# Patient Record
Sex: MALE | Race: WHITE | NOT HISPANIC OR LATINO | Employment: FULL TIME | URBAN - METROPOLITAN AREA
[De-identification: names, ages, dates, MRNs, and addresses within clinical notes are randomized per-mention and may not be internally consistent; named-entity substitution may affect disease eponyms.]

---

## 2023-03-25 ENCOUNTER — OFFICE VISIT (OUTPATIENT)
Dept: URGENT CARE | Facility: CLINIC | Age: 43
End: 2023-03-25
Payer: COMMERCIAL

## 2023-03-25 VITALS
SYSTOLIC BLOOD PRESSURE: 151 MMHG | WEIGHT: 248 LBS | HEIGHT: 66 IN | TEMPERATURE: 98 F | BODY MASS INDEX: 39.86 KG/M2 | RESPIRATION RATE: 18 BRPM | OXYGEN SATURATION: 97 % | DIASTOLIC BLOOD PRESSURE: 82 MMHG | HEART RATE: 67 BPM

## 2023-03-25 DIAGNOSIS — L25.5 PLANT DERMATITIS: Primary | ICD-10-CM

## 2023-03-25 PROCEDURE — 96372 PR INJECTION,THERAP/PROPH/DIAG2ST, IM OR SUBCUT: ICD-10-PCS | Mod: ,,,

## 2023-03-25 PROCEDURE — 99203 PR OFFICE/OUTPT VISIT, NEW, LEVL III, 30-44 MIN: ICD-10-PCS | Mod: 25,,,

## 2023-03-25 PROCEDURE — 96372 THER/PROPH/DIAG INJ SC/IM: CPT | Mod: ,,,

## 2023-03-25 PROCEDURE — 99203 OFFICE O/P NEW LOW 30 MIN: CPT | Mod: 25,,,

## 2023-03-25 RX ORDER — ZOLPIDEM TARTRATE 10 MG/1
10 TABLET ORAL NIGHTLY PRN
COMMUNITY
Start: 2023-02-08 | End: 2024-01-11

## 2023-03-25 RX ORDER — TESTOSTERONE CYPIONATE 1000 MG/10ML
100 INJECTION, SOLUTION INTRAMUSCULAR
COMMUNITY
Start: 2022-11-30 | End: 2024-01-11

## 2023-03-25 RX ORDER — SERTRALINE HYDROCHLORIDE 100 MG/1
200 TABLET, FILM COATED ORAL
COMMUNITY
Start: 2023-01-23

## 2023-03-25 RX ORDER — CLOBETASOL PROPIONATE 0.5 MG/G
CREAM TOPICAL 2 TIMES DAILY
Qty: 45 G | Refills: 0 | Status: SHIPPED | OUTPATIENT
Start: 2023-03-25 | End: 2024-01-11

## 2023-03-25 RX ORDER — DEXAMETHASONE SODIUM PHOSPHATE 100 MG/10ML
10 INJECTION INTRAMUSCULAR; INTRAVENOUS ONCE
Status: COMPLETED | OUTPATIENT
Start: 2023-03-25 | End: 2023-03-25

## 2023-03-25 RX ORDER — PREDNISONE 20 MG/1
20 TABLET ORAL DAILY
Qty: 5 TABLET | Refills: 0 | Status: SHIPPED | OUTPATIENT
Start: 2023-03-25 | End: 2023-03-30

## 2023-03-25 RX ORDER — PRAMIPEXOLE DIHYDROCHLORIDE 0.12 MG/1
0.12 TABLET ORAL
COMMUNITY
Start: 2023-02-03 | End: 2024-01-11 | Stop reason: SDUPTHER

## 2023-03-25 RX ORDER — LORAZEPAM 1 MG/1
1 TABLET ORAL DAILY PRN
COMMUNITY
Start: 2023-03-02 | End: 2024-01-11

## 2023-03-25 RX ADMIN — DEXAMETHASONE SODIUM PHOSPHATE 10 MG: 100 INJECTION INTRAMUSCULAR; INTRAVENOUS at 08:03

## 2023-03-25 NOTE — PROGRESS NOTES
"Subjective:       Patient ID: Baldomero Cavazos is a 42 y.o. male.    Vitals:  height is 5' 6" (1.676 m) and weight is 112.5 kg (248 lb). His oral temperature is 98.1 °F (36.7 °C). His blood pressure is 151/82 (abnormal) and his pulse is 67. His respiration is 18 and oxygen saturation is 97%.     Chief Complaint: Rash (Rash on left arm near elbow x 1 week.  Pt suspects poison ivy.)    Patient is a 42-year-old male that presents complaining of a poison ivy rash on left elbow times one-week.       Skin:  Positive for rash.     Objective:      Physical Exam   Constitutional: He is oriented to person, place, and time.   HENT:   Head: Normocephalic.   Cardiovascular: Normal rate.   Pulmonary/Chest: Effort normal.   Abdominal: Normal appearance.   Neurological: He is alert and oriented to person, place, and time.   Skin: Skin is rash (Red rash consistent with poison ivy). Capillary refill takes less than 2 seconds.   Psychiatric: His behavior is normal. Mood, judgment and thought content normal.       Assessment:       1. Plant dermatitis          Plan:         Plant dermatitis  -     dexAMETHasone injection 10 mg  -     predniSONE (DELTASONE) 20 MG tablet; Take 1 tablet (20 mg total) by mouth once daily. for 5 days  Dispense: 5 tablet; Refill: 0  -     clobetasoL (TEMOVATE) 0.05 % cream; Apply topically 2 (two) times daily.  Dispense: 45 g; Refill: 0    Avoid heat, hot baths and showers.  Apply steroid cream (such as hydrocortisone) to rash as instructed.  Do not apply to the face.      Follow up with your Primary Care Provider or return to the Urgent Care if fevers, worsening of rash, or reaction occurs.     Prednisone- to help with inflammation- take as prescribed- take with food.                  " verbalizes understanding/demonstrates understanding of teaching

## 2023-03-25 NOTE — PATIENT INSTRUCTIONS
Avoid heat, hot baths and showers.  Apply steroid cream (such as hydrocortisone) to rash as instructed.  Do not apply to the face.      Follow up with your Primary Care Provider or return to the Urgent Care if fevers, worsening of rash, or reaction occurs.     Prednisone- to help with inflammation- take as prescribed- take with food.

## 2024-01-11 ENCOUNTER — OFFICE VISIT (OUTPATIENT)
Dept: FAMILY MEDICINE | Facility: CLINIC | Age: 44
End: 2024-01-11
Payer: COMMERCIAL

## 2024-01-11 ENCOUNTER — TELEPHONE (OUTPATIENT)
Dept: FAMILY MEDICINE | Facility: CLINIC | Age: 44
End: 2024-01-11

## 2024-01-11 VITALS
HEIGHT: 66 IN | OXYGEN SATURATION: 98 % | HEART RATE: 74 BPM | SYSTOLIC BLOOD PRESSURE: 125 MMHG | TEMPERATURE: 98 F | DIASTOLIC BLOOD PRESSURE: 82 MMHG | WEIGHT: 243.5 LBS | RESPIRATION RATE: 18 BRPM | BODY MASS INDEX: 39.13 KG/M2

## 2024-01-11 DIAGNOSIS — I10 PRIMARY HYPERTENSION: ICD-10-CM

## 2024-01-11 DIAGNOSIS — Z13.6 ENCOUNTER FOR LIPID SCREENING FOR CARDIOVASCULAR DISEASE: ICD-10-CM

## 2024-01-11 DIAGNOSIS — Z13.220 ENCOUNTER FOR LIPID SCREENING FOR CARDIOVASCULAR DISEASE: ICD-10-CM

## 2024-01-11 DIAGNOSIS — Z00.00 ENCOUNTER FOR WELLNESS EXAMINATION: ICD-10-CM

## 2024-01-11 DIAGNOSIS — Z11.59 NEED FOR HEPATITIS C SCREENING TEST: ICD-10-CM

## 2024-01-11 DIAGNOSIS — F51.01 PRIMARY INSOMNIA: ICD-10-CM

## 2024-01-11 DIAGNOSIS — F90.2 ATTENTION DEFICIT HYPERACTIVITY DISORDER (ADHD), COMBINED TYPE: Primary | ICD-10-CM

## 2024-01-11 DIAGNOSIS — F33.42 RECURRENT MAJOR DEPRESSIVE DISORDER, IN FULL REMISSION: ICD-10-CM

## 2024-01-11 DIAGNOSIS — F41.9 ANXIETY: ICD-10-CM

## 2024-01-11 DIAGNOSIS — Z13.1 SCREENING FOR DIABETES MELLITUS: ICD-10-CM

## 2024-01-11 DIAGNOSIS — G25.81 RLS (RESTLESS LEGS SYNDROME): ICD-10-CM

## 2024-01-11 PROBLEM — G47.00 INSOMNIA: Status: ACTIVE | Noted: 2024-01-11

## 2024-01-11 PROBLEM — F90.9 ADHD (ATTENTION DEFICIT HYPERACTIVITY DISORDER): Status: ACTIVE | Noted: 2024-01-11

## 2024-01-11 PROBLEM — G47.00 INSOMNIA: Chronic | Status: ACTIVE | Noted: 2024-01-11

## 2024-01-11 PROBLEM — F32.A DEPRESSION: Status: ACTIVE | Noted: 2024-01-11

## 2024-01-11 PROCEDURE — 99214 OFFICE O/P EST MOD 30 MIN: CPT | Mod: ,,, | Performed by: FAMILY MEDICINE

## 2024-01-11 RX ORDER — DOXEPIN HYDROCHLORIDE 25 MG/1
25 CAPSULE ORAL NIGHTLY
Qty: 30 CAPSULE | Refills: 11 | Status: SHIPPED | OUTPATIENT
Start: 2024-01-11 | End: 2025-01-10

## 2024-01-11 RX ORDER — PRAMIPEXOLE DIHYDROCHLORIDE 0.12 MG/1
0.12 TABLET ORAL NIGHTLY
Qty: 90 TABLET | Refills: 3 | Status: SHIPPED | OUTPATIENT
Start: 2024-01-11 | End: 2025-01-10

## 2024-01-11 RX ORDER — LISINOPRIL 10 MG/1
10 TABLET ORAL DAILY
Qty: 90 TABLET | Refills: 3 | Status: SHIPPED | OUTPATIENT
Start: 2024-01-11 | End: 2025-01-10

## 2024-01-11 RX ORDER — LISINOPRIL 10 MG/1
10 TABLET ORAL DAILY
COMMUNITY
Start: 2023-10-18 | End: 2024-01-11 | Stop reason: SDUPTHER

## 2024-01-11 NOTE — PROGRESS NOTES
Baldomero Cavazos  01/11/2024  [id number]    Areli Paz MD  Patient Care Team:  Areli Paz MD as PCP - General (Family Medicine)      Chief Complaint:  Chief Complaint   Patient presents with    Establish Care     Needs PCP-c/o insomnia-Ambien not working. Discuss meds for weight loss and  ADHD       History of Present Illness:    43 y.o. male who presents today to establish care. He has htn, depression, anxiety, ADHD. He was seeing psych for depression/anxiety but would like me to take over his care. He could not afford ADHD meds.   Has only ever tried ambien for insomnia and states it is not working. He is up for 2-3 hrs a night cleaning his house.       Review of Systems  General: denies f/c, weight loss, night sweats, decreased appetite  Eye: denies blurred vision, changes in vision  Respiratory: denies sob, wheezing, cough  Cardiovascular: denies chest pain, palpitations, edema  Gastrointestinal: denies abdominal pain, n/v, constipation, diarrhea  Integumentary: denies rashes, pruritis    Past Medical History  Past Medical History:   Diagnosis Date    ADHD (attention deficit hyperactivity disorder)     Anxiety     Depression     Hypertension     Insomnia     Restless leg syndrome        Medications  Medication List with Changes/Refills   New Medications    DOXEPIN (SINEQUAN) 25 MG CAPSULE    Take 1 capsule (25 mg total) by mouth every evening.   Current Medications    SERTRALINE (ZOLOFT) 100 MG TABLET    Take 200 mg by mouth.   Changed and/or Refilled Medications    Modified Medication Previous Medication    LISINOPRIL 10 MG TABLET lisinopriL 10 MG tablet       Take 1 tablet (10 mg total) by mouth once daily.    Take 10 mg by mouth once daily.    PRAMIPEXOLE (MIRAPEX) 0.125 MG TABLET pramipexole (MIRAPEX) 0.125 MG tablet       Take 1 tablet (0.125 mg total) by mouth every evening.    Take 0.125 mg by mouth.   Discontinued Medications    CLOBETASOL (TEMOVATE) 0.05 % CREAM    Apply  "topically 2 (two) times daily.    LORAZEPAM (ATIVAN) 1 MG TABLET    Take 1 mg by mouth daily as needed.    TESTOSTERONE CYPIONATE (DEPOTESTOTERONE CYPIONATE) 100 MG/ML INJECTION    Inject 100 mg into the muscle every 7 days.    ZOLPIDEM (AMBIEN) 10 MG TAB    Take 10 mg by mouth nightly as needed.       Past Surgical History:   Procedure Laterality Date    FRACTURE SURGERY      right 5th digit    WISDOM TOOTH EXTRACTION         SUBJECTIVE:  Health Maintenance  The patient has no Health Maintenance topics of status Not Due  Health Maintenance Due   Topic Date Due    Hepatitis C Screening  Never done    Lipid Panel  Never done    COVID-19 Vaccine (1) Never done    HIV Screening  Never done    TETANUS VACCINE  Never done    Hemoglobin A1c (Diabetic Prevention Screening)  Never done    Influenza Vaccine (1) Never done       Exam:  Vitals:    01/11/24 1048   BP: 125/82   BP Location: Right arm   Patient Position: Sitting   BP Method: Large (Automatic)   Pulse: 74   Resp: 18   Temp: 97.9 °F (36.6 °C)   TempSrc: Oral   SpO2: 98%   Weight: 110.5 kg (243 lb 8 oz)   Height: 5' 6" (1.676 m)     Weight: 110.5 kg (243 lb 8 oz)   Body mass index is 39.3 kg/m².      Physical Exam  Constitutional: NAD, alert, pleasant  Respiratory: CTAB, no wheezes, rales or rhonchi. No accessory muscle use  Eyes: EOMI  Cardiovascular: RRR, No m/r/g. No JVD. No LE edema  Integumentary: warm, dry, intact  Psych: AA&Ox3      ICD-10-CM ICD-9-CM   1. Attention deficit hyperactivity disorder (ADHD), combined type  F90.2 314.01   2. Anxiety  F41.9 300.00   3. Recurrent major depressive disorder, in full remission  F33.42 296.36   4. Primary hypertension  I10 401.9   5. Primary insomnia  F51.01 307.42   6. RLS (restless legs syndrome)  G25.81 333.94   7. Screening for diabetes mellitus  Z13.1 V77.1   8. Encounter for lipid screening for cardiovascular disease  Z13.220 V77.91    Z13.6 V81.2   9. Encounter for wellness examination  Z00.00 V70.0   10. Need " for hepatitis C screening test  Z11.59 V73.89       1. Attention deficit hyperactivity disorder (ADHD), combined type  Overview:  Could not afford generic adderall.       2. Anxiety  Overview:  Well controlled with zoloft.     Continue current Rx meds        3. Recurrent major depressive disorder, in full remission  Overview:  Well controlled with zoloft. Denies si/hi.     Failed trintellix    Continue current Rx meds        4. Primary hypertension  Overview:  Well controlled with lisinopril. At goal.     Continue current Rx meds      Orders:  -     lisinopriL 10 MG tablet; Take 1 tablet (10 mg total) by mouth once daily.  Dispense: 90 tablet; Refill: 3    5. Primary insomnia  Overview:  Has only been on ambien and states it is not working. He is up for hours at night cleaning.     Will d/c ambien  Will try doxepin 25 mg at bedtime  Sleep hygiene discussed    Orders:  -     doxepin (SINEQUAN) 25 MG capsule; Take 1 capsule (25 mg total) by mouth every evening.  Dispense: 30 capsule; Refill: 11    6. RLS (restless legs syndrome)  Overview:  Well controlled with mirapex.     Continue current Rx meds      Orders:  -     Ferritin; Future; Expected date: 07/11/2024  -     Iron and TIBC; Future; Expected date: 07/11/2024  -     Vitamin B12; Future; Expected date: 07/11/2024  -     Folate; Future; Expected date: 07/11/2024  -     pramipexole (MIRAPEX) 0.125 MG tablet; Take 1 tablet (0.125 mg total) by mouth every evening.  Dispense: 90 tablet; Refill: 3    7. Screening for diabetes mellitus    8. Encounter for lipid screening for cardiovascular disease  -     Lipid Panel; Future; Expected date: 07/11/2024  -     Hemoglobin A1C; Future; Expected date: 07/11/2024    9. Encounter for wellness examination  -     CBC Auto Differential; Future; Expected date: 07/11/2024  -     Comprehensive Metabolic Panel; Future; Expected date: 07/11/2024  -     Lipid Panel; Future; Expected date: 07/11/2024  -     TSH; Future; Expected date:  07/11/2024  -     Hemoglobin A1C; Future; Expected date: 07/11/2024  -     Urinalysis; Future; Expected date: 07/11/2024  -     Hepatitis C Antibody; Future; Expected date: 07/11/2024    10. Need for hepatitis C screening test  -     Hepatitis C Antibody; Future; Expected date: 07/11/2024         Follow up: Follow up if symptoms worsen or fail to improve.      Care Plan/Goals: Reviewed   Goals    None

## 2024-03-06 DIAGNOSIS — G25.81 RLS (RESTLESS LEGS SYNDROME): ICD-10-CM

## 2024-03-07 ENCOUNTER — PATIENT MESSAGE (OUTPATIENT)
Dept: FAMILY MEDICINE | Facility: CLINIC | Age: 44
End: 2024-03-07
Payer: COMMERCIAL

## 2024-03-07 RX ORDER — PRAMIPEXOLE DIHYDROCHLORIDE 0.12 MG/1
0.12 TABLET ORAL NIGHTLY
Qty: 90 TABLET | Refills: 3 | Status: SHIPPED | OUTPATIENT
Start: 2024-03-07 | End: 2025-03-07

## 2024-04-09 ENCOUNTER — PATIENT MESSAGE (OUTPATIENT)
Dept: FAMILY MEDICINE | Facility: CLINIC | Age: 44
End: 2024-04-09
Payer: COMMERCIAL

## 2024-04-09 DIAGNOSIS — F33.42 RECURRENT MAJOR DEPRESSIVE DISORDER, IN FULL REMISSION: ICD-10-CM

## 2024-04-09 DIAGNOSIS — F41.9 ANXIETY: Primary | ICD-10-CM

## 2024-04-10 RX ORDER — SERTRALINE HYDROCHLORIDE 100 MG/1
200 TABLET, FILM COATED ORAL DAILY
Qty: 180 TABLET | Refills: 1 | Status: SHIPPED | OUTPATIENT
Start: 2024-04-10

## 2024-04-10 NOTE — TELEPHONE ENCOUNTER
Please advise on refills. Pt is not due for his wellness f/u until July. Do you want for him to wait to have the labs done prior to that appt. Please advise.

## 2024-04-29 ENCOUNTER — OFFICE VISIT (OUTPATIENT)
Dept: URGENT CARE | Facility: CLINIC | Age: 44
End: 2024-04-29
Payer: COMMERCIAL

## 2024-04-29 VITALS
SYSTOLIC BLOOD PRESSURE: 132 MMHG | WEIGHT: 243 LBS | BODY MASS INDEX: 39.05 KG/M2 | RESPIRATION RATE: 20 BRPM | HEART RATE: 67 BPM | TEMPERATURE: 98 F | DIASTOLIC BLOOD PRESSURE: 89 MMHG | HEIGHT: 66 IN | OXYGEN SATURATION: 98 %

## 2024-04-29 DIAGNOSIS — L23.7 POISON IVY DERMATITIS: Primary | ICD-10-CM

## 2024-04-29 PROCEDURE — 96372 THER/PROPH/DIAG INJ SC/IM: CPT | Mod: ,,, | Performed by: PHYSICIAN ASSISTANT

## 2024-04-29 PROCEDURE — 99213 OFFICE O/P EST LOW 20 MIN: CPT | Mod: 25,,, | Performed by: PHYSICIAN ASSISTANT

## 2024-04-29 RX ORDER — DEXAMETHASONE SODIUM PHOSPHATE 100 MG/10ML
10 INJECTION INTRAMUSCULAR; INTRAVENOUS
Status: COMPLETED | OUTPATIENT
Start: 2024-04-29 | End: 2024-04-29

## 2024-04-29 RX ORDER — ZOLPIDEM TARTRATE 10 MG/1
10 TABLET ORAL NIGHTLY
COMMUNITY
Start: 2024-01-24

## 2024-04-29 RX ADMIN — DEXAMETHASONE SODIUM PHOSPHATE 10 MG: 100 INJECTION INTRAMUSCULAR; INTRAVENOUS at 04:04

## 2024-04-29 NOTE — PATIENT INSTRUCTIONS
Recommend Benadryl if needed for severe itching rash in inflammation.  May continue applying calamine lotion or over-the-counter hydrocortisone cream to rash to help reduce irritation inflammation.  Recommend follow-up with primary care physician in 1-2 weeks for contact dermatitis re-evaluation if not improving.

## 2024-07-12 DIAGNOSIS — F33.42 RECURRENT MAJOR DEPRESSIVE DISORDER, IN FULL REMISSION: ICD-10-CM

## 2024-07-12 DIAGNOSIS — F41.9 ANXIETY: ICD-10-CM

## 2024-07-12 RX ORDER — SERTRALINE HYDROCHLORIDE 100 MG/1
TABLET, FILM COATED ORAL
Qty: 180 TABLET | Refills: 1 | Status: SHIPPED | OUTPATIENT
Start: 2024-07-12

## 2024-08-09 ENCOUNTER — TELEPHONE (OUTPATIENT)
Dept: FAMILY MEDICINE | Facility: CLINIC | Age: 44
End: 2024-08-09
Payer: COMMERCIAL

## 2024-08-15 LAB
CHOLEST SERPL-MSCNC: 210 MG/DL (ref 0–200)
HBA1C MFR BLD: 5.1 % (ref 4–6)
HCV AB SERPL QL IA: NEGATIVE
HDLC SERPL-MCNC: 48 MG/DL (ref 35–70)
LDLC SERPL CALC-MCNC: 152 MG/DL (ref 0–160)
TRIGL SERPL-MCNC: 48 MG/DL (ref 40–160)

## 2024-08-16 ENCOUNTER — DOCUMENTATION ONLY (OUTPATIENT)
Dept: FAMILY MEDICINE | Facility: CLINIC | Age: 44
End: 2024-08-16
Payer: COMMERCIAL

## 2024-08-19 ENCOUNTER — OFFICE VISIT (OUTPATIENT)
Dept: FAMILY MEDICINE | Facility: CLINIC | Age: 44
End: 2024-08-19
Payer: COMMERCIAL

## 2024-08-19 ENCOUNTER — TELEPHONE (OUTPATIENT)
Dept: FAMILY MEDICINE | Facility: CLINIC | Age: 44
End: 2024-08-19

## 2024-08-19 VITALS
HEART RATE: 66 BPM | SYSTOLIC BLOOD PRESSURE: 129 MMHG | BODY MASS INDEX: 42.56 KG/M2 | WEIGHT: 264.81 LBS | RESPIRATION RATE: 18 BRPM | DIASTOLIC BLOOD PRESSURE: 86 MMHG | OXYGEN SATURATION: 98 % | HEIGHT: 66 IN | TEMPERATURE: 98 F

## 2024-08-19 DIAGNOSIS — I10 PRIMARY HYPERTENSION: Chronic | ICD-10-CM

## 2024-08-19 DIAGNOSIS — Z23 NEED FOR DIPHTHERIA-TETANUS-PERTUSSIS (TDAP) VACCINE: ICD-10-CM

## 2024-08-19 DIAGNOSIS — F33.42 RECURRENT MAJOR DEPRESSIVE DISORDER, IN FULL REMISSION: Chronic | ICD-10-CM

## 2024-08-19 DIAGNOSIS — E78.2 MIXED HYPERLIPIDEMIA: ICD-10-CM

## 2024-08-19 DIAGNOSIS — G47.10 HYPERSOMNOLENCE: ICD-10-CM

## 2024-08-19 DIAGNOSIS — Z00.00 ENCOUNTER FOR WELLNESS EXAMINATION: Primary | ICD-10-CM

## 2024-08-19 DIAGNOSIS — F41.9 ANXIETY: Chronic | ICD-10-CM

## 2024-08-19 DIAGNOSIS — G25.81 RLS (RESTLESS LEGS SYNDROME): Chronic | ICD-10-CM

## 2024-08-19 DIAGNOSIS — E66.01 MORBID OBESITY WITH BMI OF 40.0-44.9, ADULT: ICD-10-CM

## 2024-08-19 PROBLEM — G47.00 INSOMNIA: Chronic | Status: RESOLVED | Noted: 2024-01-11 | Resolved: 2024-08-19

## 2024-08-19 PROCEDURE — 90471 IMMUNIZATION ADMIN: CPT | Mod: ,,, | Performed by: FAMILY MEDICINE

## 2024-08-19 PROCEDURE — 3008F BODY MASS INDEX DOCD: CPT | Mod: CPTII,,, | Performed by: FAMILY MEDICINE

## 2024-08-19 PROCEDURE — 1160F RVW MEDS BY RX/DR IN RCRD: CPT | Mod: CPTII,,, | Performed by: FAMILY MEDICINE

## 2024-08-19 PROCEDURE — 99396 PREV VISIT EST AGE 40-64: CPT | Mod: 25,,, | Performed by: FAMILY MEDICINE

## 2024-08-19 PROCEDURE — 90715 TDAP VACCINE 7 YRS/> IM: CPT | Mod: ,,, | Performed by: FAMILY MEDICINE

## 2024-08-19 PROCEDURE — 4010F ACE/ARB THERAPY RXD/TAKEN: CPT | Mod: CPTII,,, | Performed by: FAMILY MEDICINE

## 2024-08-19 PROCEDURE — 3079F DIAST BP 80-89 MM HG: CPT | Mod: CPTII,,, | Performed by: FAMILY MEDICINE

## 2024-08-19 PROCEDURE — 3074F SYST BP LT 130 MM HG: CPT | Mod: CPTII,,, | Performed by: FAMILY MEDICINE

## 2024-08-19 PROCEDURE — 1159F MED LIST DOCD IN RCRD: CPT | Mod: CPTII,,, | Performed by: FAMILY MEDICINE

## 2024-08-19 NOTE — TELEPHONE ENCOUNTER
With the labs that we received last week, I already Adhoc'd the pts Hep C. Looks like we are missing results so I called [facility] to refax the labs. They told me that everything was drawn.

## 2024-08-19 NOTE — PROGRESS NOTES
Patient ID: [id number]     Chief Complaint: Annual Exam (Wellness with labs)        HPI:     Baldomero Cavazos is a 44 y.o. male here today for an annual wellness. Reviewed and discussed lab results. Overall he feels well. He does have fatigue, snoring, insomnia. BMI of 43, never had sleep study. [place] score of 11.         8/19/2024    10:54 AM   EPWORTH SLEEPINESS SCALE   Sitting and reading 1   Watching TV 1   Sitting, inactive in a public place (e.g. a theatre or a meeting) 1   As a passenger in a car for an hour without a break 2   Lying down to rest in the afternoon when circumstances permit 3   Sitting and talking to someone 0   Sitting quietly after a lunch without alcohol 1   In a car, while stopped for a few minutes in traffic 2   Total score 11             -------------------------------------    ADHD (attention deficit hyperactivity disorder)    Anxiety    Depression    Hypertension    Insomnia    Restless leg syndrome        Past Surgical History:   Procedure Laterality Date    FRACTURE SURGERY      right 5th digit    WISDOM TOOTH EXTRACTION         Review of patient's allergies indicates:  No Known Allergies    Outpatient Medications Marked as Taking for the 8/19/24 encounter (Office Visit) with Areli Paz MD   Medication Sig Dispense Refill    lisinopriL 10 MG tablet Take 1 tablet (10 mg total) by mouth once daily. 90 tablet 3    pramipexole (MIRAPEX) 0.125 MG tablet Take 1 tablet (0.125 mg total) by mouth every evening. 90 tablet 3    sertraline (ZOLOFT) 100 MG tablet TAKE TWO TABLETS ONCE DAILY. 180 tablet 1     Current Facility-Administered Medications for the 8/19/24 encounter (Office Visit) with Areli Paz MD   Medication Dose Route Frequency Provider Last Rate Last Admin    Tdap (BOOSTRIX) vaccine injection 0.5 mL  0.5 mL Intramuscular 1 time in Clinic/HOD            Social History     Socioeconomic History    Marital status:    Tobacco Use    Smoking status:  Never    Smokeless tobacco: Never   Substance and Sexual Activity    Alcohol use: Yes     Alcohol/week: 4.0 standard drinks of alcohol     Types: 4 Drinks containing 0.5 oz of alcohol per week    Drug use: Never    Sexual activity: Yes     Partners: Female     Birth control/protection: I.U.D.     Social Determinants of Health     Financial Resource Strain: Medium Risk (8/19/2024)    Overall Financial Resource Strain (CARDIA)     Difficulty of Paying Living Expenses: Somewhat hard   Food Insecurity: No Food Insecurity (8/19/2024)    Hunger Vital Sign     Worried About Running Out of Food in the Last Year: Never true     Ran Out of Food in the Last Year: Never true   Transportation Needs: No Transportation Needs (1/11/2024)    PRAPARE - Transportation     Lack of Transportation (Medical): No     Lack of Transportation (Non-Medical): No   Physical Activity: Sufficiently Active (8/19/2024)    Exercise Vital Sign     Days of Exercise per Week: 3 days     Minutes of Exercise per Session: 150+ min   Stress: Stress Concern Present (8/19/2024)    [employer] - Occupational Stress Questionnaire     Feeling of Stress : To some extent   Housing Stability: Unknown (1/11/2024)    Housing Stability Vital Sign     Unable to Pay for Housing in the Last Year: No     Unstable Housing in the Last Year: No        Family History   Problem Relation Name Age of Onset    Diabetes Mother      Hypertension Mother      Dementia Mother      Hypertension Father      Hyperlipidemia Father      Sleep apnea Father      No Known Problems Brother          Patient Care Team:  Areli Paz MD as PCP - General (Family Medicine)     Subjective:     ROS    See HPI for details    Constitutional: Denies Change in appetite. Denies Chills. Denies Fever. Denies Night sweats.  Eye: Denies Blurred vision. Denies Discharge. Denies Eye pain.  ENT: Denies Decreased hearing. Denies Sore throat. Denies Swollen  "glands.  Respiratory: Denies Cough. Denies Shortness of breath. Denies Shortness of breath with exertion. Denies Wheezing.  Cardiovascular: DeniesChest pain at rest. Denies Chest pain with exertion. Denies Irregular heartbeat. Denies Palpitations. Denies Edema.  Gastrointestinal: Denies Abdominal pain. DeniesDiarrhea. Denies Nausea. Denies Vomiting. Denies Hematemesis or Hematochezia.  Genitourinary: Denies Dysuria. Denies Urinary frequency. Denies Urinary urgency. Denies Blood in urine.  Integumentary: Denies Rash. Denies Itching. Denies Dry skin.  Psychiatric: Denies Depression. Denies Anxiety. Denies Suicidal/Homicidal ideations.    All Other ROS: Negative except as stated in HPI.       Objective:     /86 (BP Location: Left arm, Patient Position: Sitting, BP Method: Large (Automatic))   Pulse 66   Temp 98.1 °F (36.7 °C) (Oral)   Resp 18   Ht 5' 6" (1.676 m)   Wt 120.1 kg (264 lb 12.8 oz)   SpO2 98%   BMI 42.74 kg/m²     Physical Exam    General: Alert and oriented, No acute distress.  Head: Normocephalic, Atraumatic.  Eye: Pupils are equal, round and reactive to light, Extraocular movements are intact, Sclera non-icteric.  Ears/Nose/Throat: Tm+ light reflexes bilaterally. Normal, Mucosa moist,Clear. Teeth intact. NO lesions of tongue, palate, mucosa  Respiratory: Clear to auscultation bilaterally; No wheezes, rales or rhonchi,Non-labored respirations, Symmetrical chest wall expansion.  Cardiovascular: Regular rate and rhythm, S1/S2 normal, No murmurs, rubs or gallops.  Gastrointestinal: Soft, Non-tender, Non-distended, Normal bowel sounds, No palpable organomegaly.  Integumentary: Warm, Dry, Intact, No suspicious lesions or rashes.  Extremities: No clubbing, cyanosis or edema  Psychiatric: Normal interaction, Coherent speech, Euthymic mood, Appropriate affect       Assessment:       ICD-10-CM ICD-9-CM   1. Encounter for wellness examination  Z00.00 V70.0   2. Primary hypertension  I10 401.9   3. RLS " (restless legs syndrome)  G25.81 333.94   4. Recurrent major depressive disorder, in full remission  F33.42 296.36   5. Anxiety  F41.9 300.00   6. Mixed hyperlipidemia  E78.2 272.2   7. Hypersomnolence  G47.10 780.54   8. Need for diphtheria-tetanus-pertussis (Tdap) vaccine  Z23 V06.1   9. Morbid obesity with BMI of 40.0-44.9, adult  E66.01 278.01    Z68.41 V85.41        Plan:         Health Maintenance Topics with due status: Not Due       Topic Last Completion Date    Influenza Vaccine Not Due        AAA Screening - screening for abdominal aneurysms if you have ever smoked a cigarette    Prostate Cancer Screening - starting at age 45 for  men and 50 if . Start sooner if father/brother with prostate cancer    Colon Cancer Screening -  recommended starting at age 45 unless a first degree relative has/had colon cancer then may be needed sooner    Eye Exam - Recommend annually.     Dental Exam - Recommend biannual exams.     Vaccinations - There is no immunization history for the selected administration types on file for this patient.   Patient was counseled on risks/benefits of receiving immunization. All questions were answered    Problem List Items Addressed This Visit          Neuro    RLS (restless legs syndrome) (Chronic)    Overview     Well controlled with mirapex.     Continue current Rx meds              Psychiatric    Anxiety (Chronic)    Overview     Well controlled with zoloft.     Continue current Rx meds           Recurrent major depressive disorder, in full remission (Chronic)    Overview     Well controlled with zoloft 200 mg daily. Denies si/hi.     Failed trintellix    Continue current Rx meds              Cardiac/Vascular    Hypertension (Chronic)    Overview     Well controlled with lisinopril. At goal.     Continue current Rx meds           Mixed hyperlipidemia    Overview     Ldl 152. No need for statin at this time.    Eat a low fat diet. Such foods include  baked/grilled/broiled fish/chicken, turkey, seafood, lean meats. Red meat is higher in fat.   Avoid fried, greasy foods  Continue to exercise for a total of 150 min per week               Endocrine    Morbid obesity with BMI of 40.0-44.9, adult    Overview     - exercise for 30-45 min a day, 5x a week  - eat a total of 1500 calories daily with less than 70 total carbohydrates daily  - use my fitness pal to log foods and track calories  - eat lean meats like chicken, turkey, fish, lean ground beef. Avoid fried, fatty or processed foods.  - do not eat pasta, bread, rice, potatoes often            Other Visit Diagnoses       Encounter for wellness examination    -  Primary    Hypersomnolence        refer to sleep medicine for study    Relevant Orders    Ambulatory referral/consult to Sleep Disorders    Need for diphtheria-tetanus-pertussis (Tdap) vaccine        Relevant Medications    Tdap (BOOSTRIX) vaccine injection 0.5 mL            Exercise for a total of 150 min per week and eat a healthy diet  Perform monthly self testicular exams to screen for testicular cancer  Stay up to date with all cancer screening discussed in visit  Immunizations due were discussed during visit  All health maintenance was reviewed with patient. Patient verbalized understanding. All questions were answered.     Medication List with Changes/Refills   Current Medications    LISINOPRIL 10 MG TABLET    Take 1 tablet (10 mg total) by mouth once daily.       Start Date: 1/11/2024 End Date: 1/10/2025    PRAMIPEXOLE (MIRAPEX) 0.125 MG TABLET    Take 1 tablet (0.125 mg total) by mouth every evening.       Start Date: 3/7/2024  End Date: 3/7/2025    SERTRALINE (ZOLOFT) 100 MG TABLET    TAKE TWO TABLETS ONCE DAILY.       Start Date: 7/12/2024 End Date: --   Discontinued Medications    DOXEPIN (SINEQUAN) 25 MG CAPSULE    Take 1 capsule (25 mg total) by mouth every evening.       Start Date: 1/11/2024 End Date: 8/19/2024    ZOLPIDEM (AMBIEN) 10 MG TAB     Take 10 mg by mouth every evening.       Start Date: 1/24/2024 End Date: 8/19/2024          The patient's Health Maintenance was reviewed and the following appears to be due at this time:   Health Maintenance Due   Topic Date Due    Lipid Panel  Never done    HIV Screening  Never done    TETANUS VACCINE  Never done    Hemoglobin A1c (Diabetic Prevention Screening)  Never done    COVID-19 Vaccine (1 - 2023-24 season) Never done         Follow up for 6 mts htn and one year wellness [facility] labs. In addition to their scheduled follow up, the patient has also been instructed to follow up on as needed basis.

## 2024-08-20 ENCOUNTER — DOCUMENTATION ONLY (OUTPATIENT)
Dept: FAMILY MEDICINE | Facility: CLINIC | Age: 44
End: 2024-08-20
Payer: COMMERCIAL

## 2024-08-22 ENCOUNTER — TELEPHONE (OUTPATIENT)
Dept: SLEEP MEDICINE | Facility: HOSPITAL | Age: 44
End: 2024-08-22
Payer: COMMERCIAL

## 2024-08-22 DIAGNOSIS — G47.30 SLEEP APNEA, UNSPECIFIED: Primary | ICD-10-CM

## 2024-08-22 DIAGNOSIS — R06.83 SNORING: ICD-10-CM

## 2024-08-22 DIAGNOSIS — R53.83 FATIGUE: ICD-10-CM

## 2024-08-30 DIAGNOSIS — I10 PRIMARY HYPERTENSION: ICD-10-CM

## 2024-08-30 RX ORDER — LISINOPRIL 10 MG/1
10 TABLET ORAL DAILY
Qty: 90 TABLET | Refills: 1 | Status: SHIPPED | OUTPATIENT
Start: 2024-08-30

## 2024-09-05 ENCOUNTER — TELEPHONE (OUTPATIENT)
Dept: FAMILY MEDICINE | Facility: CLINIC | Age: 44
End: 2024-09-05
Payer: COMMERCIAL

## 2024-09-05 NOTE — TELEPHONE ENCOUNTER
----- Message from Shae Mack sent at 9/5/2024 11:33 AM CDT -----  Regarding: Sleep Referral Canceled  Dear Dr. Bayron Tejada,     Thank you for referring Baldomero Cavazos for a home sleep study. Patient has decline to schedule due to cost. We are cancelling the referral. Please contact our office if you have any further questions, [phone].      Sincerely,    Shae Mack  Patient Access Representative  Neurology/Sleep Center [facility]

## 2024-11-13 ENCOUNTER — PATIENT MESSAGE (OUTPATIENT)
Dept: FAMILY MEDICINE | Facility: CLINIC | Age: 44
End: 2024-11-13

## 2024-11-25 DIAGNOSIS — G25.81 RLS (RESTLESS LEGS SYNDROME): ICD-10-CM

## 2024-11-25 RX ORDER — PRAMIPEXOLE DIHYDROCHLORIDE 0.12 MG/1
0.12 TABLET ORAL NIGHTLY
Qty: 90 TABLET | Refills: 3 | Status: SHIPPED | OUTPATIENT
Start: 2024-11-25 | End: 2024-11-26

## 2024-11-26 ENCOUNTER — TELEPHONE (OUTPATIENT)
Dept: FAMILY MEDICINE | Facility: CLINIC | Age: 44
End: 2024-11-26

## 2024-11-26 ENCOUNTER — OFFICE VISIT (OUTPATIENT)
Dept: FAMILY MEDICINE | Facility: CLINIC | Age: 44
End: 2024-11-26
Payer: COMMERCIAL

## 2024-11-26 VITALS
TEMPERATURE: 98 F | DIASTOLIC BLOOD PRESSURE: 76 MMHG | HEIGHT: 66 IN | OXYGEN SATURATION: 98 % | HEART RATE: 61 BPM | RESPIRATION RATE: 18 BRPM | BODY MASS INDEX: 41.73 KG/M2 | WEIGHT: 259.63 LBS | SYSTOLIC BLOOD PRESSURE: 119 MMHG

## 2024-11-26 DIAGNOSIS — F42.2 MIXED OBSESSIONAL THOUGHTS AND ACTS: Primary | ICD-10-CM

## 2024-11-26 DIAGNOSIS — F90.2 ATTENTION DEFICIT HYPERACTIVITY DISORDER (ADHD), COMBINED TYPE: ICD-10-CM

## 2024-11-26 DIAGNOSIS — F66 PORNOGRAPHY ADDICTION: ICD-10-CM

## 2024-11-26 DIAGNOSIS — G25.81 RLS (RESTLESS LEGS SYNDROME): Chronic | ICD-10-CM

## 2024-11-26 PROCEDURE — 1159F MED LIST DOCD IN RCRD: CPT | Mod: CPTII,,, | Performed by: FAMILY MEDICINE

## 2024-11-26 PROCEDURE — 3074F SYST BP LT 130 MM HG: CPT | Mod: CPTII,,, | Performed by: FAMILY MEDICINE

## 2024-11-26 PROCEDURE — 80307 DRUG TEST PRSMV CHEM ANLYZR: CPT | Performed by: FAMILY MEDICINE

## 2024-11-26 PROCEDURE — 99214 OFFICE O/P EST MOD 30 MIN: CPT | Mod: ,,, | Performed by: FAMILY MEDICINE

## 2024-11-26 PROCEDURE — 3078F DIAST BP <80 MM HG: CPT | Mod: CPTII,,, | Performed by: FAMILY MEDICINE

## 2024-11-26 PROCEDURE — 3044F HG A1C LEVEL LT 7.0%: CPT | Mod: CPTII,,, | Performed by: FAMILY MEDICINE

## 2024-11-26 PROCEDURE — 3008F BODY MASS INDEX DOCD: CPT | Mod: CPTII,,, | Performed by: FAMILY MEDICINE

## 2024-11-26 PROCEDURE — 1160F RVW MEDS BY RX/DR IN RCRD: CPT | Mod: CPTII,,, | Performed by: FAMILY MEDICINE

## 2024-11-26 PROCEDURE — 4010F ACE/ARB THERAPY RXD/TAKEN: CPT | Mod: CPTII,,, | Performed by: FAMILY MEDICINE

## 2024-11-26 RX ORDER — PRAMIPEXOLE DIHYDROCHLORIDE 0.25 MG/1
0.25 TABLET ORAL NIGHTLY
Qty: 30 TABLET | Refills: 11 | Status: SHIPPED | OUTPATIENT
Start: 2024-11-26 | End: 2025-11-26

## 2024-11-26 RX ORDER — LISDEXAMFETAMINE DIMESYLATE 20 MG/1
20 CAPSULE ORAL EVERY MORNING
Qty: 30 CAPSULE | Refills: 0 | Status: SHIPPED | OUTPATIENT
Start: 2024-11-26 | End: 2024-11-26 | Stop reason: SDUPTHER

## 2024-11-26 RX ORDER — LISDEXAMFETAMINE DIMESYLATE 20 MG/1
20 CAPSULE ORAL EVERY MORNING
Qty: 30 CAPSULE | Refills: 0 | Status: SHIPPED | OUTPATIENT
Start: 2024-11-26

## 2024-11-26 NOTE — PROGRESS NOTES
Baldomero Cavazos  11/26/2024  [id number]    Areli Paz MD  Patient Care Team:  Areli Paz MD as PCP - General (Family Medicine)      Chief Complaint:  Chief Complaint   Patient presents with    Anxiety     Pt feeling anxious, having trouble focusing, feeling as on edge, OCD       History of Present Illness:    44 y.o. male who presents today for anxiety, difficulty focusing and OCD. He is currently on zoloft 200 mg daily for depression and anxiety. He states that he is having difficulty focusing and completing tasks. Also recently admitted to a porn addiction. States when his mind cannot shut off, he watches porn. He also has worsening RLS. Hesperus score was 11 but he did not do sleep study.     Review of Systems  General: denies f/c, weight loss, night sweats, decreased appetite  Eye: denies blurred vision, changes in vision  Respiratory: denies sob, wheezing, cough  Cardiovascular: denies chest pain, palpitations, edema  Gastrointestinal: denies abdominal pain, n/v, constipation, diarrhea  Integumentary: denies rashes, pruritis    Past Medical History  Past Medical History:   Diagnosis Date    ADHD (attention deficit hyperactivity disorder)     Anxiety     Depression     Hypertension     Insomnia     Restless leg syndrome        Medications  Medication List with Changes/Refills   New Medications    LISDEXAMFETAMINE (VYVANSE) 20 MG CAPSULE    Take 1 capsule (20 mg total) by mouth every morning.    PRAMIPEXOLE (MIRAPEX) 0.25 MG TABLET    Take 1 tablet (0.25 mg total) by mouth every evening.   Current Medications    LISINOPRIL 10 MG TABLET    Take 1 tablet (10 mg total) by mouth once daily.    SERTRALINE (ZOLOFT) 100 MG TABLET    TAKE TWO TABLETS ONCE DAILY.   Discontinued Medications    PRAMIPEXOLE (MIRAPEX) 0.125 MG TABLET    TAKE ONE TABLET EVERY EVENING       Past Surgical History:   Procedure Laterality Date    FRACTURE SURGERY      right 5th digit    WISDOM TOOTH EXTRACTION    "      SUBJECTIVE:  Health Maintenance  Health Maintenance Topics with due status: Not Due       Topic Last Completion Date    Hemoglobin A1c (Diabetic Prevention Screening) 08/15/2024    Lipid Panel 08/15/2024    TETANUS VACCINE 08/19/2024    RSV Vaccine (Age 60+ and Pregnant patients) Not Due     Health Maintenance Due   Topic Date Due    HIV Screening  Never done    COVID-19 Vaccine (2 - 2024-25 season) 09/01/2024       Exam:  Vitals:    11/26/24 1442   BP: 119/76   BP Location: Left arm   Patient Position: Sitting   Pulse: 61   Resp: 18   Temp: 98.2 °F (36.8 °C)   TempSrc: Oral   SpO2: 98%   Weight: 117.8 kg (259 lb 9.6 oz)   Height: 5' 6" (1.676 m)     Weight: 117.8 kg (259 lb 9.6 oz)   Body mass index is 41.9 kg/m².      Physical Exam  Constitutional: NAD, alert, pleasant  Respiratory: No accessory muscle use, no cough or audible wheezing  Eyes: EOMI, no conjunctivitis   Integumentary: warm, dry, intact  Psych: AA&Ox3, answers questions appropriately        ICD-10-CM ICD-9-CM   1. Mixed obsessional thoughts and acts  F42.2 300.3   2. Attention deficit hyperactivity disorder (ADHD), combined type  F90.2 314.01   3. RLS (restless legs syndrome)  G25.81 333.94   4. Pornography addiction  F66 302.9       1. Mixed obsessional thoughts and acts  Overview:  Has had counseling. Depression and anxiety is controlled on zoloft 100 mg.     Continue current Rx meds  Wll see if vyvanse helps  Recommend Cbt      2. Attention deficit hyperactivity disorder (ADHD), combined type  Overview:  Will try vyvanse 20 mg daily  UDS today  Controlled substance agreement was signed today for adhd  Patient informed that they must return every 3 months for appt and random drug screens. If positive for any illegal substance including marijuana, the medication will be discontinued.  Patient will not receive medication if he/she frequently cancels appt or no shows or if he/she refuses random UDS  Patient is not to share medication with " others, get narcotics from other physicians or take more medication than prescribed      Orders:  -     lisdexamfetamine (VYVANSE) 20 MG capsule; Take 1 capsule (20 mg total) by mouth every morning.  Dispense: 30 capsule; Refill: 0  -     Drug Screen, Urine    3. RLS (restless legs syndrome)  Overview:  No longer Well controlled with mirapex.     Increase mirapex  Check b12/folate, iron and ferritin      Orders:  -     Testosterone, Total, LC/MS/MS; Future; Expected date: 11/26/2024  -     Vitamin B12; Future; Expected date: 11/26/2024  -     Ferritin; Future; Expected date: 11/26/2024  -     Folate; Future; Expected date: 11/26/2024  -     Iron and TIBC; Future; Expected date: 11/26/2024  -     pramipexole (MIRAPEX) 0.25 MG tablet; Take 1 tablet (0.25 mg total) by mouth every evening.  Dispense: 30 tablet; Refill: 11    4. Pornography addiction  Overview:  Recommend porn addiction therapy and marriage counseling  Continue zoloft 100 mg daily             Follow up: Follow up for 4 wks adhd.      Care Plan/Goals: Reviewed   Goals    None

## 2024-11-26 NOTE — TELEPHONE ENCOUNTER
----- Message from Nurse Hameed sent at 11/26/2024 11:49 AM CST -----  Regarding: FW: portal message  This is the pt that I spoke to you about.  ----- Message -----  From: Thalia Israel  Sent: 11/26/2024   8:35 AM CST  To: [facility] Staff  Subject: portal message                                   Who Called: Baldomero Cavazos    Caller is requesting assistance/information from provider's office.    Symptoms (please be specific):      How long has patient had these symptoms:      List of preferred pharmacies on file (remove unneeded): [unfilled]  If different, enter pharmacy into here including location and phone number:         Preferred Method of Contact: Phone Call  Patient's Preferred Phone Number on File: [phone]   Best Call Back Number, if different:  Additional Information: Pt sent a message via the portal; he stated that did not receive a reply or a call--unable to view any recent portal messages; please advise.

## 2024-11-27 LAB
AMPHET UR QL SCN: NEGATIVE
BARBITURATE SCN PRESENT UR: NEGATIVE
BENZODIAZ UR QL SCN: NEGATIVE
CANNABINOIDS UR QL SCN: NEGATIVE
COCAINE UR QL SCN: NEGATIVE
FENTANYL UR QL SCN: NEGATIVE
MDMA UR QL SCN: NEGATIVE
OPIATES UR QL SCN: NEGATIVE
PCP UR QL: NEGATIVE
PH UR: 6.5 [PH] (ref 3–11)
SPECIFIC GRAVITY, URINE AUTO (.000) (OHS): 1.02 (ref 1–1.03)

## 2024-12-05 DIAGNOSIS — I10 PRIMARY HYPERTENSION: ICD-10-CM

## 2024-12-05 RX ORDER — LISINOPRIL 10 MG/1
10 TABLET ORAL
Qty: 90 TABLET | Refills: 1 | Status: SHIPPED | OUTPATIENT
Start: 2024-12-05

## 2024-12-06 ENCOUNTER — PATIENT MESSAGE (OUTPATIENT)
Dept: FAMILY MEDICINE | Facility: CLINIC | Age: 44
End: 2024-12-06
Payer: COMMERCIAL

## 2024-12-09 DIAGNOSIS — F90.2 ATTENTION DEFICIT HYPERACTIVITY DISORDER (ADHD), COMBINED TYPE: Primary | ICD-10-CM

## 2024-12-09 RX ORDER — DEXTROAMPHETAMINE SACCHARATE, AMPHETAMINE ASPARTATE MONOHYDRATE, DEXTROAMPHETAMINE SULFATE AND AMPHETAMINE SULFATE 7.5; 7.5; 7.5; 7.5 MG/1; MG/1; MG/1; MG/1
30 CAPSULE, EXTENDED RELEASE ORAL EVERY MORNING
Qty: 30 CAPSULE | Refills: 0 | Status: SHIPPED | OUTPATIENT
Start: 2024-12-09 | End: 2024-12-10 | Stop reason: SDUPTHER

## 2024-12-10 DIAGNOSIS — F90.2 ATTENTION DEFICIT HYPERACTIVITY DISORDER (ADHD), COMBINED TYPE: ICD-10-CM

## 2024-12-10 RX ORDER — DEXTROAMPHETAMINE SACCHARATE, AMPHETAMINE ASPARTATE MONOHYDRATE, DEXTROAMPHETAMINE SULFATE AND AMPHETAMINE SULFATE 7.5; 7.5; 7.5; 7.5 MG/1; MG/1; MG/1; MG/1
30 CAPSULE, EXTENDED RELEASE ORAL EVERY MORNING
Qty: 30 CAPSULE | Refills: 0 | Status: SHIPPED | OUTPATIENT
Start: 2024-12-10 | End: 2025-01-09

## 2024-12-10 NOTE — TELEPHONE ENCOUNTER
It is not letting me propose the script so that you can send to the [facility]. I did change the pharmacy though

## 2025-01-02 ENCOUNTER — DOCUMENTATION ONLY (OUTPATIENT)
Dept: FAMILY MEDICINE | Facility: CLINIC | Age: 45
End: 2025-01-02
Payer: COMMERCIAL

## 2025-01-06 ENCOUNTER — OFFICE VISIT (OUTPATIENT)
Dept: FAMILY MEDICINE | Facility: CLINIC | Age: 45
End: 2025-01-06
Payer: COMMERCIAL

## 2025-01-06 VITALS
DIASTOLIC BLOOD PRESSURE: 87 MMHG | HEART RATE: 73 BPM | HEIGHT: 66 IN | TEMPERATURE: 98 F | BODY MASS INDEX: 41.33 KG/M2 | RESPIRATION RATE: 18 BRPM | SYSTOLIC BLOOD PRESSURE: 132 MMHG | WEIGHT: 257.19 LBS | OXYGEN SATURATION: 99 %

## 2025-01-06 DIAGNOSIS — G25.81 RLS (RESTLESS LEGS SYNDROME): Chronic | ICD-10-CM

## 2025-01-06 DIAGNOSIS — F90.2 ATTENTION DEFICIT HYPERACTIVITY DISORDER (ADHD), COMBINED TYPE: Primary | ICD-10-CM

## 2025-01-06 DIAGNOSIS — I10 PRIMARY HYPERTENSION: Chronic | ICD-10-CM

## 2025-01-06 PROCEDURE — 1159F MED LIST DOCD IN RCRD: CPT | Mod: CPTII,,, | Performed by: FAMILY MEDICINE

## 2025-01-06 PROCEDURE — 3008F BODY MASS INDEX DOCD: CPT | Mod: CPTII,,, | Performed by: FAMILY MEDICINE

## 2025-01-06 PROCEDURE — 3075F SYST BP GE 130 - 139MM HG: CPT | Mod: CPTII,,, | Performed by: FAMILY MEDICINE

## 2025-01-06 PROCEDURE — 1160F RVW MEDS BY RX/DR IN RCRD: CPT | Mod: CPTII,,, | Performed by: FAMILY MEDICINE

## 2025-01-06 PROCEDURE — 99214 OFFICE O/P EST MOD 30 MIN: CPT | Mod: ,,, | Performed by: FAMILY MEDICINE

## 2025-01-06 PROCEDURE — 3079F DIAST BP 80-89 MM HG: CPT | Mod: CPTII,,, | Performed by: FAMILY MEDICINE

## 2025-01-06 RX ORDER — DEXTROAMPHETAMINE SACCHARATE, AMPHETAMINE ASPARTATE MONOHYDRATE, DEXTROAMPHETAMINE SULFATE AND AMPHETAMINE SULFATE 7.5; 7.5; 7.5; 7.5 MG/1; MG/1; MG/1; MG/1
30 CAPSULE, EXTENDED RELEASE ORAL EVERY MORNING
Qty: 30 CAPSULE | Refills: 0 | Status: SHIPPED | OUTPATIENT
Start: 2025-03-06 | End: 2025-04-05

## 2025-01-06 RX ORDER — DEXTROAMPHETAMINE SACCHARATE, AMPHETAMINE ASPARTATE MONOHYDRATE, DEXTROAMPHETAMINE SULFATE AND AMPHETAMINE SULFATE 7.5; 7.5; 7.5; 7.5 MG/1; MG/1; MG/1; MG/1
30 CAPSULE, EXTENDED RELEASE ORAL EVERY MORNING
Qty: 30 CAPSULE | Refills: 0 | Status: SHIPPED | OUTPATIENT
Start: 2025-02-06 | End: 2025-03-08

## 2025-01-06 RX ORDER — DEXTROAMPHETAMINE SACCHARATE, AMPHETAMINE ASPARTATE MONOHYDRATE, DEXTROAMPHETAMINE SULFATE AND AMPHETAMINE SULFATE 7.5; 7.5; 7.5; 7.5 MG/1; MG/1; MG/1; MG/1
30 CAPSULE, EXTENDED RELEASE ORAL EVERY MORNING
Qty: 30 CAPSULE | Refills: 0 | Status: SHIPPED | OUTPATIENT
Start: 2025-01-06 | End: 2025-01-09 | Stop reason: SDUPTHER

## 2025-01-06 NOTE — PROGRESS NOTES
Baldomero Cavazos  01/06/2025  [id number]    Areli Paz MD  Patient Care Team:  Areli Paz MD as PCP - General (Family Medicine)      Chief Complaint:  Chief Complaint   Patient presents with    Follow-up     4 week f/u for ADHD       History of Present Illness:     44 y.o. male who presents today for 3 mth adhd f/u. Doing well. No complaints or concerns today.    Review of Systems  General: denies f/c, weight loss, night sweats, decreased appetite  Eye: denies blurred vision, changes in vision  Respiratory: denies sob, wheezing, cough  Cardiovascular: denies chest pain, palpitations, edema  Gastrointestinal: denies abdominal pain, n/v, constipation, diarrhea  Integumentary: denies rashes, pruritis    Past Medical History  Past Medical History:   Diagnosis Date    ADHD (attention deficit hyperactivity disorder)     Anxiety     Depression     Hypertension     Insomnia     Restless leg syndrome        Medications  Medication List with Changes/Refills   New Medications    DEXTROAMPHETAMINE-AMPHETAMINE (ADDERALL XR) 30 MG 24 HR CAPSULE    Take 1 capsule (30 mg total) by mouth every morning.    DEXTROAMPHETAMINE-AMPHETAMINE (ADDERALL XR) 30 MG 24 HR CAPSULE    Take 1 capsule (30 mg total) by mouth every morning.   Current Medications    LISINOPRIL 10 MG TABLET    TAKE ONE TABLET BY MOUTH ONCE DAILY    PRAMIPEXOLE (MIRAPEX) 0.25 MG TABLET    Take 1 tablet (0.25 mg total) by mouth every evening.    SERTRALINE (ZOLOFT) 100 MG TABLET    TAKE TWO TABLETS ONCE DAILY.   Changed and/or Refilled Medications    Modified Medication Previous Medication    DEXTROAMPHETAMINE-AMPHETAMINE (ADDERALL XR) 30 MG 24 HR CAPSULE dextroamphetamine-amphetamine (ADDERALL XR) 30 MG 24 hr capsule       Take 1 capsule (30 mg total) by mouth every morning.    Take 1 capsule (30 mg total) by mouth every morning.   Discontinued Medications    LISDEXAMFETAMINE (VYVANSE) 20 MG CAPSULE    Take 1 capsule (20 mg total) by mouth every  "morning.       Past Surgical History:   Procedure Laterality Date    FRACTURE SURGERY      right 5th digit    WISDOM TOOTH EXTRACTION         SUBJECTIVE:  Health Maintenance  Health Maintenance Topics with due status: Not Due       Topic Last Completion Date    Hemoglobin A1c (Diabetic Prevention Screening) 08/15/2024    Lipid Panel 08/15/2024    TETANUS VACCINE 08/19/2024    RSV Vaccine (Age 60+ and Pregnant patients) Not Due     Health Maintenance Due   Topic Date Due    HIV Screening  Never done    COVID-19 Vaccine (2 - 2024-25 season) 09/01/2024       Exam:  Vitals:    01/06/25 1133   BP: 132/87   BP Location: Left arm   Patient Position: Sitting   Pulse: 73   Resp: 18   Temp: 98.2 °F (36.8 °C)   TempSrc: Oral   SpO2: 99%   Weight: 116.7 kg (257 lb 3.2 oz)   Height: 5' 6" (1.676 m)     Weight: 116.7 kg (257 lb 3.2 oz)   Body mass index is 41.51 kg/m².      Physical Exam  Constitutional: NAD, alert, pleasant  Respiratory: No accessory muscle use, no cough or audible wheezing  Eyes: EOMI, no conjunctivitis   Integumentary: warm, dry, intact  Psych: AA&Ox3, answers questions appropriately        ICD-10-CM ICD-9-CM   1. Attention deficit hyperactivity disorder (ADHD), combined type  F90.2 314.01   2. Primary hypertension  I10 401.9   3. RLS (restless legs syndrome)  G25.81 333.94       1. Attention deficit hyperactivity disorder (ADHD), combined type  Overview:  Vyvanse not covered    Currently on adderall xr 30 mg daily. Doing well.      Controlled substance agreement was signed   Patient informed that they must return every 3 months for appt and random drug screens. If positive for any illegal substance including marijuana, the medication will be discontinued.  Patient will not receive medication if he/she frequently cancels appt or no shows or if he/she refuses random UDS  Patient is not to share medication with others, get narcotics from other physicians or take more medication than prescribed      Orders:  -    "  dextroamphetamine-amphetamine (ADDERALL XR) 30 MG 24 hr capsule; Take 1 capsule (30 mg total) by mouth every morning.  Dispense: 30 capsule; Refill: 0  -     dextroamphetamine-amphetamine (ADDERALL XR) 30 MG 24 hr capsule; Take 1 capsule (30 mg total) by mouth every morning.  Dispense: 30 capsule; Refill: 0  -     dextroamphetamine-amphetamine (ADDERALL XR) 30 MG 24 hr capsule; Take 1 capsule (30 mg total) by mouth every morning.  Dispense: 30 capsule; Refill: 0    2. Primary hypertension  Overview:  Well controlled with lisinopril. At goal.     Continue current Rx meds        3. RLS (restless legs syndrome)  Overview:  Well controlled with mirapex 0.25 mg nightly    Continue current Rx meds               Follow up: Follow up for 3 mts adhd.      Care Plan/Goals: Reviewed   Goals    None

## 2025-01-09 ENCOUNTER — PATIENT MESSAGE (OUTPATIENT)
Dept: FAMILY MEDICINE | Facility: CLINIC | Age: 45
End: 2025-01-09
Payer: COMMERCIAL

## 2025-01-09 DIAGNOSIS — F90.2 ATTENTION DEFICIT HYPERACTIVITY DISORDER (ADHD), COMBINED TYPE: ICD-10-CM

## 2025-01-09 DIAGNOSIS — R79.89 ELEVATED FERRITIN: Primary | ICD-10-CM

## 2025-01-09 RX ORDER — DEXTROAMPHETAMINE SACCHARATE, AMPHETAMINE ASPARTATE MONOHYDRATE, DEXTROAMPHETAMINE SULFATE AND AMPHETAMINE SULFATE 7.5; 7.5; 7.5; 7.5 MG/1; MG/1; MG/1; MG/1
30 CAPSULE, EXTENDED RELEASE ORAL EVERY MORNING
Qty: 30 CAPSULE | Refills: 0 | Status: SHIPPED | OUTPATIENT
Start: 2025-01-09 | End: 2025-02-08

## 2025-01-09 RX ORDER — DEXTROAMPHETAMINE SACCHARATE, AMPHETAMINE ASPARTATE MONOHYDRATE, DEXTROAMPHETAMINE SULFATE AND AMPHETAMINE SULFATE 7.5; 7.5; 7.5; 7.5 MG/1; MG/1; MG/1; MG/1
30 CAPSULE, EXTENDED RELEASE ORAL EVERY MORNING
Qty: 30 CAPSULE | Refills: 0 | Status: CANCELLED | OUTPATIENT
Start: 2025-01-09 | End: 2025-02-08

## 2025-01-09 NOTE — TELEPHONE ENCOUNTER
Pt needs scripts for Adderall to go to the [facility] in [place].     Also wife is questioning the Ferritin level.

## 2025-01-23 DIAGNOSIS — F33.42 RECURRENT MAJOR DEPRESSIVE DISORDER, IN FULL REMISSION: ICD-10-CM

## 2025-01-23 DIAGNOSIS — F41.9 ANXIETY: ICD-10-CM

## 2025-01-24 RX ORDER — SERTRALINE HYDROCHLORIDE 100 MG/1
TABLET, FILM COATED ORAL
Qty: 180 TABLET | Refills: 1 | Status: SHIPPED | OUTPATIENT
Start: 2025-01-24

## 2025-02-20 ENCOUNTER — OFFICE VISIT (OUTPATIENT)
Dept: FAMILY MEDICINE | Facility: CLINIC | Age: 45
End: 2025-02-20
Payer: COMMERCIAL

## 2025-02-20 VITALS
TEMPERATURE: 99 F | HEIGHT: 66 IN | HEART RATE: 81 BPM | DIASTOLIC BLOOD PRESSURE: 82 MMHG | RESPIRATION RATE: 18 BRPM | SYSTOLIC BLOOD PRESSURE: 123 MMHG | WEIGHT: 251.69 LBS | OXYGEN SATURATION: 99 % | BODY MASS INDEX: 40.45 KG/M2

## 2025-02-20 DIAGNOSIS — F90.2 ATTENTION DEFICIT HYPERACTIVITY DISORDER (ADHD), COMBINED TYPE: ICD-10-CM

## 2025-02-20 DIAGNOSIS — I10 PRIMARY HYPERTENSION: Chronic | ICD-10-CM

## 2025-02-20 DIAGNOSIS — F33.42 RECURRENT MAJOR DEPRESSIVE DISORDER, IN FULL REMISSION: Primary | Chronic | ICD-10-CM

## 2025-02-20 DIAGNOSIS — R61 HYPERHIDROSIS: ICD-10-CM

## 2025-02-20 NOTE — PROGRESS NOTES
Baldomero Cavazos  02/20/2025  [id number]    Areli Paz MD  Patient Care Team:  Arlei Paz MD as PCP - General (Family Medicine)      Chief Complaint:  Chief Complaint   Patient presents with    Follow-up     Follow up       History of Present Illness:    44 y.o. male who presents today for htn f/u. Bp at goal.     Patient states that he has always perspired easily but it is much worse since being on adderall. Of note, he is also on zoloft 200 mg daily. I did discuss with him that these are side effects.     He also states that wife told him to mention that he has been more irritable and on edge. Patient denies this and states that he actually feels really good and denies any anhedonia, irritability, depression. Patient and wife are dealing with marital discord and he feels that there is simply just tension in the marriage at this time.         Review of Systems  General: denies f/c, weight loss, night sweats, decreased appetite  Eye: denies blurred vision, changes in vision  Respiratory: denies sob, wheezing, cough  Cardiovascular: denies chest pain, palpitations, edema  Gastrointestinal: denies abdominal pain, n/v, constipation, diarrhea  Integumentary: denies rashes, pruritis    Past Medical History  Past Medical History:   Diagnosis Date    ADHD (attention deficit hyperactivity disorder)     Anxiety     Depression     Hypertension     Insomnia     Restless leg syndrome        Medications  Medication List with Changes/Refills   Current Medications    DEXTROAMPHETAMINE-AMPHETAMINE (ADDERALL XR) 30 MG 24 HR CAPSULE    Take 1 capsule (30 mg total) by mouth every morning.    DEXTROAMPHETAMINE-AMPHETAMINE (ADDERALL XR) 30 MG 24 HR CAPSULE    Take 1 capsule (30 mg total) by mouth every morning.    DEXTROAMPHETAMINE-AMPHETAMINE (ADDERALL XR) 30 MG 24 HR CAPSULE    Take 1 capsule (30 mg total) by mouth every morning.    LISINOPRIL 10 MG TABLET    TAKE ONE TABLET BY MOUTH ONCE DAILY    PRAMIPEXOLE  "(MIRAPEX) 0.25 MG TABLET    Take 1 tablet (0.25 mg total) by mouth every evening.    SERTRALINE (ZOLOFT) 100 MG TABLET    TAKE TWO TABLETS ONCE DAILY.       Past Surgical History:   Procedure Laterality Date    FRACTURE SURGERY      right 5th digit    WISDOM TOOTH EXTRACTION         SUBJECTIVE:  Health Maintenance  Health Maintenance Topics with due status: Not Due       Topic Last Completion Date    Hemoglobin A1c (Diabetic Prevention Screening) 08/15/2024    Lipid Panel 08/15/2024    TETANUS VACCINE 08/19/2024    RSV Vaccine (Age 60+ and Pregnant patients) Not Due     Health Maintenance Due   Topic Date Due    HIV Screening  Never done    COVID-19 Vaccine (2 - 2024-25 season) 09/01/2024       Exam:  Vitals:    02/20/25 0930   BP: 123/82   BP Location: Left arm   Patient Position: Sitting   Pulse: 81   Resp: 18   Temp: 98.6 °F (37 °C)   TempSrc: Oral   SpO2: 99%   Weight: 114.2 kg (251 lb 11.2 oz)   Height: 5' 6" (1.676 m)     Weight: 114.2 kg (251 lb 11.2 oz)   Body mass index is 40.63 kg/m².      Physical Exam  Constitutional: NAD, alert, pleasant  Respiratory: No accessory muscle use, no cough or audible wheezing  Eyes: EOMI, no conjunctivitis   Integumentary: warm, dry, intact  Psych: AA&Ox3, answers questions appropriately        ICD-10-CM ICD-9-CM   1. Recurrent major depressive disorder, in full remission  F33.42 296.36   2. Primary hypertension  I10 401.9   3. Attention deficit hyperactivity disorder (ADHD), combined type  F90.2 314.01   4. Hyperhidrosis  R61 705.21       1. Recurrent major depressive disorder, in full remission  Overview:  Well controlled with zoloft 200 mg daily. Denies si/hi.     Failed trintellix    Continue current Rx meds        2. Primary hypertension  Overview:  Well controlled with lisinopril. At goal.     Continue current Rx meds        3. Attention deficit hyperactivity disorder (ADHD), combined type  Overview:  Vyvanse not covered    Currently on adderall xr 30 mg daily. Doing " well.      Controlled substance agreement was signed   Patient informed that they must return every 3 months for appt and random drug screens. If positive for any illegal substance including marijuana, the medication will be discontinued.  Patient will not receive medication if he/she frequently cancels appt or no shows or if he/she refuses random UDS  Patient is not to share medication with others, get narcotics from other physicians or take more medication than prescribed        4. Hyperhidrosis  Overview:  2/2 to meds    Patient wishes to continue current meds           Follow up: No follow-ups on file.      Care Plan/Goals: Reviewed   Goals    None

## 2025-02-24 ENCOUNTER — PATIENT MESSAGE (OUTPATIENT)
Dept: FAMILY MEDICINE | Facility: CLINIC | Age: 45
End: 2025-02-24
Payer: COMMERCIAL

## 2025-02-27 DIAGNOSIS — F90.2 ATTENTION DEFICIT HYPERACTIVITY DISORDER (ADHD), COMBINED TYPE: ICD-10-CM

## 2025-02-28 RX ORDER — DEXTROAMPHETAMINE SACCHARATE, AMPHETAMINE ASPARTATE MONOHYDRATE, DEXTROAMPHETAMINE SULFATE AND AMPHETAMINE SULFATE 7.5; 7.5; 7.5; 7.5 MG/1; MG/1; MG/1; MG/1
30 CAPSULE, EXTENDED RELEASE ORAL EVERY MORNING
Qty: 30 CAPSULE | Refills: 0 | Status: SHIPPED | OUTPATIENT
Start: 2025-02-28 | End: 2025-03-30

## 2025-04-08 ENCOUNTER — OFFICE VISIT (OUTPATIENT)
Dept: FAMILY MEDICINE | Facility: CLINIC | Age: 45
End: 2025-04-08
Payer: COMMERCIAL

## 2025-04-08 VITALS
BODY MASS INDEX: 41.25 KG/M2 | HEIGHT: 66 IN | SYSTOLIC BLOOD PRESSURE: 122 MMHG | OXYGEN SATURATION: 98 % | DIASTOLIC BLOOD PRESSURE: 72 MMHG | RESPIRATION RATE: 16 BRPM | HEART RATE: 68 BPM | TEMPERATURE: 98 F | WEIGHT: 256.63 LBS

## 2025-04-08 DIAGNOSIS — F42.2 MIXED OBSESSIONAL THOUGHTS AND ACTS: ICD-10-CM

## 2025-04-08 DIAGNOSIS — R61 HYPERHIDROSIS: ICD-10-CM

## 2025-04-08 DIAGNOSIS — R53.83 FATIGUE, UNSPECIFIED TYPE: ICD-10-CM

## 2025-04-08 DIAGNOSIS — F90.2 ATTENTION DEFICIT HYPERACTIVITY DISORDER (ADHD), COMBINED TYPE: ICD-10-CM

## 2025-04-08 DIAGNOSIS — F66 PORNOGRAPHY ADDICTION: ICD-10-CM

## 2025-04-08 DIAGNOSIS — Z79.890 HORMONE REPLACEMENT THERAPY: ICD-10-CM

## 2025-04-08 DIAGNOSIS — F41.9 ANXIETY: Chronic | ICD-10-CM

## 2025-04-08 DIAGNOSIS — Z86.59 PERSONAL HISTORY OF OTHER MENTAL AND BEHAVIORAL DISORDERS: Primary | ICD-10-CM

## 2025-04-08 NOTE — ASSESSMENT & PLAN NOTE
Monitored the patient's excessive sweating, particularly in social situations.  Evaluated the possibility of hyperhidrosis being related to anxiety or ADHD medication.  Assessed the need for further investigation into the cause of excessive sweating.  Treatment will focus on managing underlying causes (anxiety, ADHD) as determined by psychiatric evaluation.

## 2025-04-08 NOTE — ASSESSMENT & PLAN NOTE
Baldomero is currently taking Adderall for ADHD but went without it for a month.  Evaluated the patient's response to Adderall, noting uncertainty about its efficacy and potential side effect of excessive sweating.  Recommend psychiatric evaluation to reassess ADHD symptoms and medication efficacy.  Patient declined the referral at this time.     Adderall refill deferred today.

## 2025-04-08 NOTE — ASSESSMENT & PLAN NOTE
Baldomero experiences anxiety symptoms, particularly in social situations.  Continued current prescription of Zoloft 100mg twice daily for anxiety and depression.  Recommend psychiatric evaluation to reassess anxiety symptoms and medication efficacy. Patient declines at this time.

## 2025-04-08 NOTE — ASSESSMENT & PLAN NOTE
Evaluated the patient's reported fatigue and lack of drive.  Assessed fatigue as potentially related to psychological factors rather than testosterone levels.  Recommend psychiatric evaluation to further investigate the cause of fatigue. Patient declines at this time.     Treatment plan will be determined based on the results of the psychiatric evaluation.

## 2025-04-08 NOTE — PROGRESS NOTES
Patient ID: [id number]     Chief Complaint: Follow-up, ADHD, Labs Only ([facility] labs done last year. They want to discuss with you since they never were. ), and Anxiety    HPI:     Baldomero presents today for ADHD follow up. He is accompanied today by his wife.     ADHD AND ANXIETY:  He reports being off Adderall for one month and is uncertain about its effectiveness, questioning whether his issues stem from anxiety or focus problems. He experiences excessive sweating as a side effect, particularly in social situations, leading him to leave W[employer] services on two occasions. His symptoms worsen when he becomes aware of others noticing. He is considering discontinuing Adderall if it is causing the sweating symptoms.    CURRENT SYMPTOMS:  He reports fatigue and decreased motivation, manifesting as difficulty following through with physical tasks.    MEDICATIONS:  He continues Zoloft 100mg twice daily. He reports a previous unsuccessful attempt to discontinue Zoloft, with return of symptoms necessitating resumption. He was previously prescribed testosterone for low levels, though denies being diagnosed with hypogonadism.    PAST MEDICAL CARE:  He previously received care at [facility] but felt uncomfortable with the environment due to the presence of individuals with substance use issues.    Past Medical History:   Diagnosis Date    ADHD (attention deficit hyperactivity disorder)     Anxiety     Depression     Hypertension     Insomnia     Restless leg syndrome         Past Surgical History:   Procedure Laterality Date    FRACTURE SURGERY      right 5th digit    WISDOM TOOTH EXTRACTION          Social History     Socioeconomic History    Marital status:    Tobacco Use    Smoking status: Never    Smokeless tobacco: Never   Substance and Sexual Activity    Alcohol use: Yes     Alcohol/week: 4.0 standard drinks of alcohol     Types: 4 Drinks containing 0.5 oz of alcohol per week    Drug use: Never    Sexual  activity: Yes     Partners: Female     Birth control/protection: I.U.D.     Social Drivers of Health     Financial Resource Strain: Low Risk  (2/17/2025)    Overall Financial Resource Strain (CARDIA)     Difficulty of Paying Living Expenses: Not very hard   Food Insecurity: No Food Insecurity (2/17/2025)    Hunger Vital Sign     Worried About Running Out of Food in the Last Year: Never true     Ran Out of Food in the Last Year: Never true   Transportation Needs: No Transportation Needs (2/17/2025)    PRAPARE - Transportation     Lack of Transportation (Medical): No     Lack of Transportation (Non-Medical): No   Physical Activity: Insufficiently Active (2/17/2025)    Exercise Vital Sign     Days of Exercise per Week: 1 day     Minutes of Exercise per Session: 30 min   Stress: Stress Concern Present (2/17/2025)    [employer] - Occupational Stress Questionnaire     Feeling of Stress : To some extent   Housing Stability: Low Risk  (2/17/2025)    Housing Stability Vital Sign     Unable to Pay for Housing in the Last Year: No     Number of Times Moved in the Last Year: 0     Homeless in the Last Year: No        Current Outpatient Medications   Medication Instructions    dextroamphetamine-amphetamine (ADDERALL XR) 30 MG 24 hr capsule 30 mg, Oral, Every morning    lisinopriL 10 mg, Oral    pramipexole (MIRAPEX) 0.25 mg, Oral, Nightly    sertraline (ZOLOFT) 100 MG tablet TAKE TWO TABLETS ONCE DAILY.       Review of patient's allergies indicates:  No Known Allergies     Patient Care Team:  Areli Paz MD as PCP - General (Family Medicine)     Subjective:     Review of Systems   Constitutional:  Negative for activity change and unexpected weight change.   HENT:  Negative for hearing loss, rhinorrhea and trouble swallowing.    Eyes:  Negative for discharge and visual disturbance.   Respiratory:  Negative for chest tightness and wheezing.    Cardiovascular:  Negative for chest pain and  "palpitations.   Gastrointestinal:  Negative for blood in stool, constipation, diarrhea and vomiting.   Endocrine: Negative for polydipsia and polyuria.   Genitourinary:  Negative for difficulty urinating, hematuria and urgency.   Musculoskeletal:  Negative for arthralgias, joint swelling and neck pain.   Neurological:  Negative for weakness and headaches.   Psychiatric/Behavioral:  Negative for confusion and dysphoric mood.        12 point review of systems conducted, negative except as stated in the history of present illness. See HPI for details.    Objective:     Visit Vitals  /72 (BP Location: Right arm)   Pulse 68   Temp 98.2 °F (36.8 °C) (Oral)   Resp 16   Ht 5' 6" (1.676 m)   Wt 116.4 kg (256 lb 9.6 oz)   SpO2 98%   BMI 41.42 kg/m²       Physical Exam  Constitutional:       Appearance: He is obese.   HENT:      Head: Normocephalic.   Pulmonary:      Effort: Pulmonary effort is normal.   Musculoskeletal:         General: Normal range of motion.      Cervical back: Normal range of motion.   Neurological:      Mental Status: He is alert and oriented to person, place, and time.   Psychiatric:         Attention and Perception: Attention normal.         Mood and Affect: Mood normal.         Speech: Speech normal.         Behavior: Behavior is agitated.         Thought Content: Thought content normal.         Cognition and Memory: Cognition and memory normal.         Judgment: Judgment normal.       The patient declined to undergo a physical medical examination at the conclusion of the office visit.  However, during the visit, he exhibited significant agitation in response to the recommendations provided.      Assessment:       ICD-10-CM ICD-9-CM   1. Personal history of other mental and behavioral disorders  Z86.59 V11.8   2. Attention deficit hyperactivity disorder (ADHD), combined type  F90.2 314.01   3. Anxiety  F41.9 300.00   4. Hyperhidrosis  R61 705.21   5. Fatigue, unspecified type  R53.83 780.79   6. " Hormone replacement therapy  Z79.890 V07.4   7. Mixed obsessional thoughts and acts  F42.2 300.3   8. Pornography addiction  F66 302.9      Plan:     1. Personal history of other mental and behavioral disorders  Assessment & Plan:  Assessed complex case involving ADHD, anxiety, and history of mixed obsessional thoughts and acts.  Determined need for psychiatric evaluation to address root cause of symptoms and optimize treatment plan.    Patient declined referral to psychiatry at this time.       2. Attention deficit hyperactivity disorder (ADHD), combined type  Assessment & Plan:  Baldomero is currently taking Adderall for ADHD but went without it for a month.  Evaluated the patient's response to Adderall, noting uncertainty about its efficacy and potential side effect of excessive sweating.  Recommend psychiatric evaluation to reassess ADHD symptoms and medication efficacy.  Patient declined the referral at this time.     Adderall refill deferred today.       3. Anxiety  Assessment & Plan:  Baldomero experiences anxiety symptoms, particularly in social situations.  Continued current prescription of Zoloft 100mg twice daily for anxiety and depression.  Recommend psychiatric evaluation to reassess anxiety symptoms and medication efficacy. Patient declines at this time.       4. Hyperhidrosis  Assessment & Plan:  Monitored the patient's excessive sweating, particularly in social situations.  Evaluated the possibility of hyperhidrosis being related to anxiety or ADHD medication.  Assessed the need for further investigation into the cause of excessive sweating.  Treatment will focus on managing underlying causes (anxiety, ADHD) as determined by psychiatric evaluation.      5. Fatigue, unspecified type  Assessment & Plan:  Evaluated the patient's reported fatigue and lack of drive.  Assessed fatigue as potentially related to psychological factors rather than testosterone levels.  Recommend psychiatric evaluation to  further investigate the cause of fatigue. Patient declines at this time.     Treatment plan will be determined based on the results of the psychiatric evaluation.      6. Hormone replacement therapy  Assessment & Plan:  Monitored the patient's history of testosterone replacement therapy.  Evaluated recent testosterone level, which was 469, within the normal range.    Assessed that current symptoms are multifactorial and psychological evaluation is needed in addition to urology referral for testosterone replacement.     Advised the patient to consider urology referral if they wish to pursue testosterone replacement therapy in the future.   Patient declined referral at this time.       7. Mixed obsessional thoughts and acts  Overview:  Previously had tried counseling.   Depression and anxiety is controlled on zoloft 100 mg BID.         8. Pornography addiction  Overview:  Previous porn addiction. Continue zoloft 100 mg BID.     Assessment & Plan:  Patient reports this is well controlled.          Follow up for Keep Scheduled Appointment.. In addition to their scheduled follow up, the patient has also been instructed to follow up on as needed basis.     This note was generated with the assistance of ambient listening technology. Verbal consent was obtained by the patient and accompanying visitor(s) for the recording of patient appointment to facilitate this note. I attest to having reviewed and edited the generated note for accuracy, though some syntax or spelling errors may persist. Please contact the author of this note for any clarification.    I spent a total of 36 minutes on the day of the visit.  This includes face to face time and non-face to face time preparing to see the patient (eg, review of tests), obtaining and/or reviewing separately obtained history, documenting clinical information in the electronic or other health record, independently interpreting results and communicating results to the  patient/family/caregiver, or care coordinator.    Salas Vazquez, Adult-Gerontology NP

## 2025-04-08 NOTE — ASSESSMENT & PLAN NOTE
Monitored the patient's history of testosterone replacement therapy.  Evaluated recent testosterone level, which was 469, within the normal range.    Assessed that current symptoms are multifactorial and psychological evaluation is needed in addition to urology referral for testosterone replacement.     Advised the patient to consider urology referral if they wish to pursue testosterone replacement therapy in the future.   Patient declined referral at this time.